# Patient Record
(demographics unavailable — no encounter records)

---

## 2024-10-18 NOTE — ASSESSMENT
[FreeTextEntry1] : Assessment: Nina Conde is a 79 year old woman with past medical history of Hypertension, Hyperlipidemia, Chronic kidney disease, Asthma, Sarcoidosis & Polymyalgia Rheumatica and TIA presents for follow up visit.  The patient was last evaluated here in the CV office in September and recent ER visit for chest pressure and cough.   The patient presented to [facility] ER on 9/10 for chest pressure and cough, had negative COVID, RSV and Influenza testing, also negative troponin and pro BNP. She reports recent presyncope symptoms without syncope. ECG today consistent with sinus rhythm, poor R wave progression, similar to prior ECGs. Echocardiogram (3/2024) consistent with normal LVEF 60-65%, mild LVH and normal RV size and function. Carotid US (12/2023) with mild plaque, no significant stenosis.   need clerance from pul dr davila for nuclear and do in hospital  Recommendations: [] Pre syncope: Orthostatic vital signs checked in office and negative. Recommended patient to discontinue Amlodipine at this time and reduce Coreg to 3.125 mg BID and to continue to monitor BP. Increase fluid intake. One week cardiac event monitor placed on patient to ensure no arrhythmias or high degree conduction disease. Check carotid US. [] Chest discomfort: Patient reports longstanding history of this, with atypical features however due to multiple CV risk factors including coronary calcifications - recommend pharmacologic nuclear stress test - explained to patient and she would like to think about this further, she also has a history of asthma, will need clearance from Pulmonary. Of note she reports possibly anaphylaxis with IV contrast. She reports unremarkable coronary angiogram at [facility] years ago. [] Coronary calcifications, HLD: Incidental finding on CTPA. LDL 72 mg/dl in 5/2024 is well controlled. Continue high intensity statin; Atorvastatin 40 mg daily  [] Hypertension: Plan for Coreg 3.125 mg BID as per above. [] Return to office: 1 month

## 2024-10-18 NOTE — PHYSICAL EXAM
[Normal Conjunctiva] : normal conjunctiva [Abnormal Gait] : abnormal gait [No Edema] : no edema [Normal] : alert and oriented, normal memory [de-identified] : no acute distress [de-identified] : supple, no carotid bruits b/l [de-identified] : JVP ~ 7 cm H20, RRR, s1, s2, no murmurs [de-identified] : unlabored respirations, clear lung fields [de-identified] : obese

## 2024-10-18 NOTE — HISTORY OF PRESENT ILLNESS
[FreeTextEntry1] : Nina Conde is a 79 year old woman with past medical history of Hypertension, Hyperlipidemia, Chronic kidney disease, Asthma, Sarcoidosis & Polymyalgia Rheumatica and TIA presents for follow up visit.  The patient was last evaluated here in the CV office in September and recent ER visit for chest pressure and cough.  feeling well

## 2024-10-18 NOTE — FAMILY HISTORY
[TextEntry] : Mother: Heart disease, Hypertension Father: Stomach cancer Brother: Heart disease Sister: CAD s/p CABG (age 40s)

## 2024-10-18 NOTE — CARDIOLOGY SUMMARY
[de-identified] : ECG (10/9/23) at [facility]: sinus rhythm, left axis deviation, LVH, poor R wave progression (similar to prior ECG) ECG (1/25/24): normal sinus rhythm, left axis deviation, borderline RVH, poor R wave progression ECG (3/21/24): normal sinus rhythm, left axis deviation, poor R wave progression  ECG (9/19/24): normal sinus rhythm, left axis deviation, poor R wave progression [de-identified] : Cardiac Event Monitor (11/2023): Normal sinus rhythm. One NSVT (7 beats). 8 SVT episodes (longest 42 seconds).  Cardiac Event Monitor (9/2024): Normal sinus rhythm. One NSVT (14 beats). 4 SVT (11 beats).  [de-identified] : TTE (10/1/23): LVEF 50-55%, mild LVH. LA enlargement. TTE (3/2024): LVEF 60-65%, mild LVH. Normal RV size and function. LA enlargement.  [de-identified] : Carotid US (9/2024): No significant hemodynamic stenosis b/l. [de-identified] : CTPA (10/2023): No pulmonary embolus. Cardiomegaly. Mild coronary calcification. No pericardial effusion. No aortic aneurysm or dissection.

## 2024-10-18 NOTE — ASSESSMENT
[FreeTextEntry1] : Assessment: Nina Conde is a 79 year old woman with past medical history of Hypertension, Hyperlipidemia, Chronic kidney disease, Asthma, Sarcoidosis & Polymyalgia Rheumatica and TIA presents for follow up visit.  The patient was last evaluated here in the CV office in September and recent ER visit for chest pressure and cough.   The patient presented to [facility] ER on 9/10 for chest pressure and cough, had negative COVID, RSV and Influenza testing, also negative troponin and pro BNP. She reports recent presyncope symptoms without syncope. ECG today consistent with sinus rhythm, poor R wave progression, similar to prior ECGs. Echocardiogram (3/2024) consistent with normal LVEF 60-65%, mild LVH and normal RV size and function. Carotid US (12/2023) with mild plaque, no significant stenosis.   need clerance from pul dr davila for nuclear and do in hospital  Recommendations: [] Pre syncope: Orthostatic vital signs checked in office and negative. Recommended patient to discontinue Amlodipine at this time and reduce Coreg to 3.125 mg BID and to continue to monitor BP. Increase fluid intake. One week cardiac event monitor placed on patient to ensure no arrhythmias or high degree conduction disease. Check carotid US. [] Chest discomfort: Patient reports longstanding history of this, with atypical features however due to multiple CV risk factors including coronary calcifications - recommend pharmacologic nuclear stress test - explained to patient and she would like to think about this further, she also has a history of asthma, will need clearance from Pulmonary. Of note she reports possibly anaphylaxis with IV contrast. She reports unremarkable coronary angiogram at [facility] years ago. [] Coronary calcifications, HLD: Incidental finding on CTPA. LDL 72 mg/dl in 5/2024 is well controlled. Continue high intensity statin; Atorvastatin 40 mg daily  [] Hypertension: Plan for Coreg 3.125 mg BID as per above. [] Return to office: 1 month

## 2024-10-18 NOTE — CARDIOLOGY SUMMARY
[de-identified] : ECG (10/9/23) at [facility]: sinus rhythm, left axis deviation, LVH, poor R wave progression (similar to prior ECG) ECG (1/25/24): normal sinus rhythm, left axis deviation, borderline RVH, poor R wave progression ECG (3/21/24): normal sinus rhythm, left axis deviation, poor R wave progression  ECG (9/19/24): normal sinus rhythm, left axis deviation, poor R wave progression [de-identified] : Cardiac Event Monitor (11/2023): Normal sinus rhythm. One NSVT (7 beats). 8 SVT episodes (longest 42 seconds).  Cardiac Event Monitor (9/2024): Normal sinus rhythm. One NSVT (14 beats). 4 SVT (11 beats).  [de-identified] : TTE (10/1/23): LVEF 50-55%, mild LVH. LA enlargement. TTE (3/2024): LVEF 60-65%, mild LVH. Normal RV size and function. LA enlargement.  [de-identified] : Carotid US (9/2024): No significant hemodynamic stenosis b/l. [de-identified] : CTPA (10/2023): No pulmonary embolus. Cardiomegaly. Mild coronary calcification. No pericardial effusion. No aortic aneurysm or dissection.

## 2024-10-18 NOTE — PHYSICAL EXAM
[Normal Conjunctiva] : normal conjunctiva [Abnormal Gait] : abnormal gait [No Edema] : no edema [Normal] : alert and oriented, normal memory [de-identified] : no acute distress [de-identified] : supple, no carotid bruits b/l [de-identified] : JVP ~ 7 cm H20, RRR, s1, s2, no murmurs [de-identified] : unlabored respirations, clear lung fields [de-identified] : obese

## 2024-10-19 NOTE — REVIEW OF SYSTEMS
[Dizziness] : dizziness [Blurry Vision] : no blurred vision [Sore Throat] : no sore throat [SOB] : no shortness of breath [Lower Ext Edema] : no extremity edema [Palpitations] : no palpitations [Syncope] : no syncope [Cough] : no cough [Abdominal Pain] : no abdominal pain [Rash] : no rash [Confusion] : no confusion was observed [Easy Bruising] : no tendency for easy bruising [Chest Discomfort] : chest discomfort

## 2024-11-07 NOTE — HISTORY OF PRESENT ILLNESS
[TextBox_4] : Ms. Conde is a 79-year-old female who has PMR, obesity, asthma, allergies, sarcoidosis, GERD, OSAS - untreated, s/p Botox for achalasia, sarcoidosis, CKD, depression, HTN, HLD, NSVT & TIA. She presents for a follow up visit.   Her chief concern is pre-procedural clearance for Nuclear Stress test to be performed in hospital by Dr. Carlson.   She states she has episodes of tachycardia for which she will be undergoing a stress test for. She is in her baseline state of health. Patient states she has not been on Breo as she had performed out months ago.  She states her insurance would not cover it out of time but she thinks it may be covered when at home. She states she had continued on montelukast nightly. Patient states she is more active over the past year and noticed improvement in her shortness of breath. She does endorse intermittent, faint wheezing about overnight.  Patient denies fever/chills, decreased appetite, increased fatigue, cough, wheezing, shortness of breath at rest or exertion, or chest tightness at this time.

## 2024-11-07 NOTE — REASON FOR VISIT
[Follow-Up] : a follow-up visit [Asthma] : asthma [Sleep Apnea] : sleep apnea [Wheezing] : wheezing [Pre-op Risk Stratification] : pre-op risk stratification

## 2024-11-07 NOTE — PHYSICAL EXAM
[No Acute Distress] : no acute distress [Normal Oropharynx] : normal oropharynx [Normal Appearance] : normal appearance [No Neck Mass] : no neck mass [Normal Rate/Rhythm] : normal rate/rhythm [Normal S1, S2] : normal s1, s2 [No Murmurs] : no murmurs [No Resp Distress] : no resp distress [Clear to Auscultation Bilaterally] : clear to auscultation bilaterally [No Abnormalities] : no abnormalities [Benign] : benign [No Clubbing] : no clubbing [No Cyanosis] : no cyanosis [No Edema] : no edema [FROM] : FROM [Normal Color/ Pigmentation] : normal color/ pigmentation [No Focal Deficits] : no focal deficits [Oriented x3] : oriented x3 [Normal Affect] : normal affect [Gait - Sufficient For Exercise Testing] : gait sufficient for exercise testing [TextBox_99] : Walker in use.

## 2024-11-07 NOTE — ASSESSMENT
[FreeTextEntry1] : Ms. Conde is a 79-year-old female who has PMR, obesity, asthma, allergies, sarcoidosis, GERD, OSAS - untreated, s/p Botox for achalasia, sarcoidosis, CKD, depression, HTN, HLD, NSVT & TIA. She presents for a follow up visit. Her chief concern is pre-procedural clearance for Nuclear Stress test to be performed in hospital by Dr. Carlson.   1. Pre-Op Pulmonary Exam: - Patient presents today in baseline state of health with no new or acute pulmonary concerns at this time. Her SOB has improved since prior visit. Patient advised to take inhalers the morning of procedure. At this point in time there are no absolute pulmonary contraindications and patient is to move forward with the planned procedure. - PFT and 6MWT today WNL.   2. Asthma: - Has nebulizer at home (dispensed 10/2023).  - Add Albuterol HFA 2 puffs Q4-6H PRN. - Restart Breo 200 mcg daily. 1 inhale. Rinse and gargle post use. - Continue Montelukast 10 mg PO QHS.   3. SRIRAM - mild in past x 2 tests, but not currently treated: - I have discussed all the negative health consequences associated with obstructive and central sleep apnea including heart conditions/MI, hypertension, diabetes, chronic inflammation, memory issues, stroke, obesity, decreased libido, sleep related accidents, as well as anxiety and depression. - Additional recommendations included: Avoid alcohol and sedatives at bedtime. Proper sleep hygiene such as maintaining a regular sleep routine, avoiding naps if possible, not watching TV or reading in bed, and maintaining a quiet, comfortable bedroom. Sleepy driving avoidance and risks discussed with patient. - Diet, exercise and weight loss suggested. - Will need to retest in future.  Patient to follow up with Dr. Montgomery as scheduled on 5/7/2025.  Patient to call with further questions and concerns. Patient verbalizes understanding of care plan and is agreeable.

## 2024-11-07 NOTE — PROCEDURE
[FreeTextEntry1] : Pulmonary testing performed in office today because of pre-procedural pulmonary clearance.   PFT's performed in office show improvement when compared to last year's test results. Today's results are normal values.  FEV1: 88%  FEV1/FVC Ratio: 98% DVN70-19%: 97% DLCO: 77%  Feno: 16 ppb; low/normal  6MWT Performed in office shows: RA SPO2 @ REST: 96% RA SPO2 on EXERTION: 94-96%

## 2024-12-12 NOTE — ASSESSMENT
[FreeTextEntry1] : Pt had coronary calcifications on CT PE study 10/2023 should follow up w/ cardio for ischemic workup she has a call out to Dr Carlson

## 2024-12-12 NOTE — HISTORY OF PRESENT ILLNESS
[FreeTextEntry1] : here for multiple issues [de-identified] : fatigue achy muscles bones  pains in chest saw cardio was to do astress test had ECHO results noted blood pressure was fluctuating and her meds were increased by cardio doubled meds  saw pulmonary  ruq pain few weeks

## 2025-01-09 NOTE — ASSESSMENT
[FreeTextEntry1] : EKG personally reviewed same as prior EKG CP seems atyipcal but if persists or worse advised to go to ER has apt to see cardio this mos

## 2025-01-09 NOTE — HISTORY OF PRESENT ILLNESS
[de-identified] : couldn't get up out of bed and had back pain  hip joint area and had to go down didn't fall on left side left posterior hip  chest pressure nonexertional  worse when lying down at night or doing quick movements feels occ palpitations no SOB diaphoresis  [FreeTextEntry1] : low back pain

## 2025-01-11 NOTE — ASSESSMENT
[FreeTextEntry1] : Assessment: Nina Conde is a 79 year old woman with past medical history of Hypertension, Hyperlipidemia, Chronic kidney disease, Asthma, Sarcoidosis, Polymyalgia Rheumatica and TIA presents for follow up visit.  Since her last visit the patient reports recurrent episodes of chest discomfort that can be worse with exertion and associated with palpitations. She has her home BP log with well controlled readings at home. ECG today consistent with sinus rhythm, poor R wave progression, similar to prior ECGs. Echocardiogram (10/2024) consistent with normal LVEF 55-60%, mild-moderate LVH, mildly enlarged RV with normal RV systolic function and mild pulmonary hypertension. Carotid US (9/2024) consistent with no significant hemodynamic stenosis b/l. Cardiac event monitor (9/2024) consistent with normal sinus rhythm, one NSVT episode of 14 beats and SVT episodes.   Recommendations: [] Chest discomfort: Patient reports longstanding history of this, with atypical features however due to multiple CV risk factors including coronary calcifications - recommend pharmacologic nuclear stress test to ensure no coronary ischemia- patient was evaluated by Pulmonary, will plan for this on Monday. Of note she reports possibly anaphylaxis with IV contrast. She reports unremarkable coronary angiogram at [facility] years ago. [] Coronary calcifications, HLD: Incidental finding on CTPA. LDL 72 mg/dl in 5/2024 is well controlled. Continue high intensity statin; Atorvastatin 40 mg daily  [] Hypertension: Stable. Continue Coreg 6.25 mg in AM and 3.125 mg in PM. [] Return to office: Plan for nuclear stress test next week

## 2025-01-11 NOTE — CARDIOLOGY SUMMARY
[de-identified] : ECG (10/9/23) at [facility]: sinus rhythm, left axis deviation, LVH, poor R wave progression (similar to prior ECG) ECG (1/25/24): normal sinus rhythm, left axis deviation, borderline RVH, poor R wave progression ECG (1/10/25): normal sinus rhythm, left axis deviation, poor R wave progression [de-identified] : Cardiac Event Monitor (11/2023): Normal sinus rhythm. One NSVT (7 beats). 8 SVT episodes (longest 42 seconds).  Cardiac Event Monitor (9/2024): Normal sinus rhythm. One NSVT (14 beats). 4 SVT (11 beats).  [de-identified] : TTE (3/2024): LVEF 60-65%, mild LVH. Normal RV size and function. LA enlargement.  TTE (10/2024): LVEF 55-60%, mild-moderate LVH. Mildly enlarged RV with normal RV systolic function. Mild pulm HTN. No pericardial effusion. [de-identified] : Carotid US (9/2024): No significant hemodynamic stenosis b/l. Kidney arterial US (7/2024): No evidence of a significant renal artery stenosis [de-identified] : CTPA (10/2023): No pulmonary embolus. Cardiomegaly. Mild coronary calcification. No pericardial effusion. No aortic aneurysm or dissection.

## 2025-01-11 NOTE — ASSESSMENT
[FreeTextEntry1] : Assessment: Nina Conde is a 79 year old woman with past medical history of Hypertension, Hyperlipidemia, Chronic kidney disease, Asthma, Sarcoidosis, Polymyalgia Rheumatica and TIA presents for follow up visit.  Since her last visit the patient reports recurrent episodes of chest discomfort that can be worse with exertion and associated with palpitations. She has her home BP log with well controlled readings at home. ECG today consistent with sinus rhythm, poor R wave progression, similar to prior ECGs. Echocardiogram (10/2024) consistent with normal LVEF 55-60%, mild-moderate LVH, mildly enlarged RV with normal RV systolic function and mild pulmonary hypertension. Carotid US (9/2024) consistent with no significant hemodynamic stenosis b/l. Cardiac event monitor (9/2024) consistent with normal sinus rhythm, one NSVT episode of 14 beats and SVT episodes.   Recommendations: [] Chest discomfort: Patient reports longstanding history of this, with atypical features however due to multiple CV risk factors including coronary calcifications - recommend pharmacologic nuclear stress test to ensure no coronary ischemia- patient was evaluated by Pulmonary, will plan for this on Monday. Of note she reports possibly anaphylaxis with IV contrast. She reports unremarkable coronary angiogram at [facility] years ago. [] Coronary calcifications, HLD: Incidental finding on CTPA. LDL 72 mg/dl in 5/2024 is well controlled. Continue high intensity statin; Atorvastatin 40 mg daily  [] Hypertension: Stable. Continue Coreg 6.25 mg in AM and 3.125 mg in PM. [] Return to office: Plan for nuclear stress test next week

## 2025-01-11 NOTE — REVIEW OF SYSTEMS
[Chest Discomfort] : chest discomfort [Headache] : no headache [Blurry Vision] : no blurred vision [Sore Throat] : no sore throat [SOB] : no shortness of breath [Lower Ext Edema] : no extremity edema [Palpitations] : no palpitations [Syncope] : no syncope [Cough] : no cough [Abdominal Pain] : no abdominal pain [Rash] : no rash [Dizziness] : no dizziness [Confusion] : no confusion was observed [Easy Bruising] : no tendency for easy bruising

## 2025-01-11 NOTE — CARDIOLOGY SUMMARY
[de-identified] : Cardiac Event Monitor (11/2023): Normal sinus rhythm. One NSVT (7 beats). 8 SVT episodes (longest 42 seconds).  Cardiac Event Monitor (9/2024): Normal sinus rhythm. One NSVT (14 beats). 4 SVT (11 beats).  [de-identified] : ECG (10/9/23) at [facility]: sinus rhythm, left axis deviation, LVH, poor R wave progression (similar to prior ECG) ECG (1/25/24): normal sinus rhythm, left axis deviation, borderline RVH, poor R wave progression ECG (1/10/25): normal sinus rhythm, left axis deviation, poor R wave progression [de-identified] : TTE (3/2024): LVEF 60-65%, mild LVH. Normal RV size and function. LA enlargement.  TTE (10/2024): LVEF 55-60%, mild-moderate LVH. Mildly enlarged RV with normal RV systolic function. Mild pulm HTN. No pericardial effusion. [de-identified] : Carotid US (9/2024): No significant hemodynamic stenosis b/l. Kidney arterial US (7/2024): No evidence of a significant renal artery stenosis [de-identified] : CTPA (10/2023): No pulmonary embolus. Cardiomegaly. Mild coronary calcification. No pericardial effusion. No aortic aneurysm or dissection.

## 2025-01-11 NOTE — PHYSICAL EXAM
[Normal Conjunctiva] : normal conjunctiva [Abnormal Gait] : abnormal gait [No Edema] : no edema [Normal] : alert and oriented, normal memory [de-identified] : no acute distress [de-identified] : supple, no carotid bruits b/l [de-identified] : JVP ~ 7 cm H20, RRR, s1, s2, no murmurs [de-identified] : unlabored respirations, clear lung fields [de-identified] : obese

## 2025-01-11 NOTE — HISTORY OF PRESENT ILLNESS
[FreeTextEntry1] : Nina Conde is a 79 year old woman with past medical history of Hypertension, Hyperlipidemia, Chronic kidney disease, Asthma, Sarcoidosis, Polymyalgia Rheumatica and TIA presents for follow up visit.  Since her last visit the patient reports recurrent episodes of chest discomfort that can be worse with exertion and associated with palpitations. She has her home BP log with well controlled BP.

## 2025-01-14 NOTE — REVIEW OF SYSTEMS
[Arthralgias] : arthralgias [Joint Pain] : joint pain [Joint Swelling] : no joint swelling [Joint Stiffness] : no joint stiffness [As Noted in HPI] : as noted in HPI [Difficulty Walking] : difficulty walking [Negative] : Heme/Lymph [de-identified] : Memory issues

## 2025-01-14 NOTE — ASSESSMENT
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 Had been able to taper steroids without recurrence of sx to pre-steroid levels but with ongoing HA, scalp TTP and mild shoulder sx as well as only 50% improvement in ESR. Due to ongoing elevated ESR, recurrence of temporal pain, diffuse arthralgias and SPEP/UPEP negative -- increased concern for recurrence of GCA, started on Actemra and moderate dose steroids with improvement in sx and labs, currently quiescent.  # PMR/GCA, + hx of sarcoid, HA continues to appear more tension mediated - c/w Actemra qweekly dosing - GCA symptoms reviewed with patient, advised to call office if develops any, go to ER if any visual changes - recent imaging without pulm or neuro sarcoid  - last labs reviewed and stable - repeat prior to next visit   # immunosuppressed status, prior single dermatome limited shingles outbreak - pt knows to call if febrile or unwell, knows to hold medication while on Abx - advised annual flu vaccine, covid booster   # OA and MSK related pain in back and shoulders/hips, TMJ. Tapering of steroids has unmasked this. Generalized deconditioning and poor adherence to lifestyle changes contributing and progressing. # peripheral neuropathy  # unsteady gait, less falls, lightheadedness of unclear etiology - f/u neuro/PMR for trigger point injections as these help with her pain - c/w prednisone 5mg/day  - c/w lyrica, try to increase to 100 mg at bedtime since off Cymbalta as minimal other med options for OA pain at present time  - c/w Tylenol ES and again reviewed recommendation to use consistently 2 tabs to 2x/day - pt declines PT referral today   # OP in femoral neck, lowest T score -2.7, + steroids, low Vit D - Getting Prolia with PMD  - Ca 600mg BID with food - c/w Vit D 1000 IU daily as slightly low and was not taking daily - Weight bearing exercise for 30min 3-4x/week to maintain BMD   Seen with Keke Rodgers MD, Rheumatology Fellow, PGY-5

## 2025-01-14 NOTE — HISTORY OF PRESENT ILLNESS
[FreeTextEntry1] : SUNITA QUEZADA is a 76 year old woman who presents for rheumatologic second opinion for prior dx of GCA/PMR. Markedly elevated ESR since Nov 2019, with peak at 114 in July 2020 accompanied by shoulder/hip girdle as well as diffuse arthralgias with stiffness and limited ROM as well as worsening temporal headaches and scalp tenderness. At that time, pt underwent b/l TAB which were negative, and was started on prednisone 40mg daily with improvement in above sx. Has since been able to taper to 5mg daily which she has been on for the last month. Overall sx have not recurred at pre-steroid levels during taper but pt does report ongoing mild sx of scalp TTP and intermittent temporal HA with only moderate decline in ESR, with last check at 47. In light of this, prior rheum suggested transition from steroids to Actemra given her other comorbidities including DM2. Pt was reluctant to start injectable biologics.  + R TMJ pain but with palpation as well as chewing and no jaw fatigability and not selecting softer foods  + extensive OA with chronic mild pain from this + sarcoid, stable at present without any pulmonary, skin, ocular or joint involvement   Denies visual changes, jaw claudication, F/C, night sweats, unintentional weight loss, limb weakness or paresthesias.   Inflammatory arthritis ROS negative for symmetrical peripheral joint synovitis, prolonged AM stiffness, enthesitis, dactylitis, psoriasis/ rashes, eye inflammation, inflammatory low back pain, IBD.   Labs - + ESR, peak 114, presently 47, now risen to 72  Negative - RF, KRISTINE, Hep B, TB  TAB b/l - negative for GCA  XR knees - small enthesophyte, minimal OA  XR ribs - negative, NAPD  DEXA 2022 - lowest T score -2.7 in hip, worse than prior  -------  2/4/21 -- Diffuse OA related arthralgias as she is tapering off steroids but remains without PMR or GCA sx. Ongoing R TMJ but not fatigability of jaw, rather pain with increased oral aperture 2/2 joint. Less ambulatory 2/2 OA pain, noting weight increasing and LE edema. No sarcoid sx.   3/11/21 -- Since last visit, s/p both covid vaccine doses, severe myalgias/arthralgias following each but improved after 1st one and does not seem to be improving s/p 2nd one 1 week ago. + diffuse joint pains in shoulder/hip girdle and also wrists and knees, but no synovitis. HA worsening though still intermittent but TA tenderness has returned. Vision stable, no jaw claudication. Ongoing TMJ issues, due to see ENT. No CP, SOB, F/C, infectious sx, focal weakness or neuropathic sx.   4/2/21 -- Started on Actemra 2 weeks ago, no issues with administering, brings son today so we can teach him as well. Ongoing dull HA, partially improved with Tylenol, prior TA tenderness resolved, vision stable, and no current PMR complaints. No F/C, infectious sx, feels well otherwise.   5/7/21 -- Increased fatigue, diffuse arthralgias, shingles on L buttock that began shortly after last visit, pt did not notify any of her physicians, continued Actemra. Lesions have now fully resolved, no active vesicles but with some post herpetic pain, over a single dermatome, did not have disseminated disease. Remains with occasional dull HA but has improved since last visit, remainder of GCA/PMR ROS negative.   7/9/21 -- TMJ resolved, HA has also markedly improved. Pending R cataract sx. Feeling well otherwise, no PMR sx, no other GCA sx, no issues with injections. No infectious sx.   10/19/21-- Worsening pain in b/l shoulders, CT spinal pain as well, temporary improvement with trigger injections x 2 weeks, then spinal pain returns. Pain mgmt is trying to increase Lyrica dose but its is sedating her. No current HA, remainder of GCA/PMR ROS negative, no infectious sx, having some issues with self administration of Actemra but her son helps her if needed.   11/19/21 -- Returns early as she feels she needs the prednisone on some days and wants to know if she can use prn 2.5mg/day. Also will be [place] of state and wont be able to get actemra delivered, asking if ok to miss dose by 3 days. GCA ROS/PMR ROS negative, the low dose steroids have moreso been helping with her neck/upper back pain. Asking about trials of other meds for her ongoing pedal neuropathy as she has not been able to increase lyrica and now wants to get off it if she can. Also worried that her memory seems to be getting worse but no issues with ingrid forgetting of things, never gets lost.   1/13/22 -- Overall feeling better from last visit, lower Lyrica dose is less sedating. Ongoing QHS cervicogenic HA but responsive to low dose tylenol (taking 250mg), asking if she should continue trigger point injections as not always helpful. GCA/PMR ROS negative, taking Actemra without issues, no infectious sx. 1 fall but no fx, being more cautious with walking.   3/18/22 -- Ongoing OA related arthralgias, no synovitis and GCA/PMR ROS remain negative. Continues to have an issue with self administering Actemra so has been using it approx 10 min out of the fridge but this is causing some pain/bruising, not amenable to changing to infusions however. No further falls.   4/12/22 -- GCA/PMR quiescent, no peripheral synovitis, ongoing diffuse OA related arthralgias and worsening neuropathy especially in RLE since tapering dose of Lyrica. Does feel being on steroids daily somewhat helps, has not been taking Tylenol consistently. Intermittent HA but without GCA characteristics, likely related to eye strain and will be getting cataract sx upcoming. No falls. No infectious sx, still feels like Actemra injection is difficult.   5/27/22 -- Actemra injections going better. No current PMR/GCA or sarcoid sx, no peripheral synovitis. Ongoing OA related pain but able to do all ADLs. Tolerated increase in Lyrica and helping but not fully, asking if we can increase again, not overly sedating her. Ongoing intermittent HA but responsive to tylenol, s/p cataract sx and healed well. No falls.   7/12/22 -- Feeling unwell x 2 weeks, fatigued, diffuse arthralgias, worst in L shoulder where she has prior limited ROM, and L hip which has had achy pain with radiation to thigh, tho not worsening with ROM. + some pedal edema, sharp temporal HA over R side. Neuro saw her, had MRI, doppler and EEG -- all normal. No infectious sx, no F/C, no synovitis, no rashes, remainder of GCA ROS negative and R shoulder unaffected.    9/9/22 -- Prior severe fatigue and arthralgias have resolved. More chronic pain at present and some dependant edema now, has been advised to limit salt in diet but continues to have a high salt intake and son present at visit today reports she minimally walks during the day. She is moving to a new apt with 2 flights of stairs to enter, hard time walking up the stairs. Cardiology did ask for a stress test, she has not scheduled yet. GCA/PMR ROS negative, taking Actemra daily, no SE or infections.   12/13/22 -- Overall stable from last, ongoing chronic arthralgias with ongoing gait issues and L hip arthralgias, no falls but hard to move around, did not go to PT, PMD recently reordered PT which she has not started. PMR/GCA ROS negative, recent optho exam also negative, ongoing tension HA but responsive to Tylenol. No infectious sx.   3/7/23 -- Ongoing C spine and shoulder MSK pain, more focal TTP over R sided scapula/posterior ribs x few weeks, no preceding trauma, ongoing L sided costochondritis pain, seeing cards tomorrow too, has not had stress test as yet. Has not started PT or optimized tylenol at present tho does find benefit with QHS dosing. No current PMR/GCA sx, ongoing intermittent tension HA, remains responsive to tylenol. No other inflammatory sx. She had cut back on driving 2/2 her chronic peripheral neuropathy.   5/19/2023 -- Endorsing worsening joint pain, weakness, near falls, and difficulty doing tasks, more constant than it has been in the past, has not been able to follow up with PT. Endorsing headaches, not constant or daily, describes as nagging but not worse headache of life, Tylenol not always effective, will usually fall asleep and pain resolves. Doing Actemra qow and still on prednisone 5mg qday. Sarcoid ROS negative. intermittent productive cough with clear phlegm, worse in AM and directly before bed, which is new. No clear infectious sx.   8/29/23 -- Son accompanied pt to visit today. Recent fall but no ongoing pain, worsening deconditioning as she is minimally active during the day, reports diffuse pain but stable, reports ongoing depressive sx but hasn't been taking her SSRI for a long time (tho reports telling her PMD she has been taking), no SI/HI. Not driving at present and still living in apt with stairs but son is trying to get her alternative, more accessible housing. Resumed Actemra qweekly, no issues, no current PMR/GCA sx, remains with tension HA responsive to tylenol. Sarcoid ROS negative, prior cough resolved.   11/30/23 -- Pt says she has had worsening memory the last couple of months. Had TIA episode 10/2023. Also has fallen 3x since 10/2023. Says she ambulates and falls, but does not remember how she falls. Has not gone to hospital since falls. Has lower back, hip, rib pain since falls. Has stopped driving 2/2 above, but continues to live alone, feels she can still do ADLs independently.   4/11/24 -- Pt had mechanical fall in office last visit, hospitalized and was in [place] for ~1 month, ambulating better with walker but still reports some unsteady gait. Cardiac/neuro workup negative, ultimately thought to be 2/2 dehydration vs iatrogenic hypotension 2/2 home meds. After discharge, had syncope fall, workup again unrevealing, ACE discontinued. Pt says she feels well today, more mindful to keep hydrated with water. Has same chronic baseline frontal tension headaches which remain Tylenol responsive, GCA and PMR ROS negative, remains on weekly Actemra and prednisone 5mg daily. Sarcoid ROS negative, imaging not suggestive either.   5/30/24 -- Using walker consistently, 1 slide/fall into a chair but no ingrid falls, completed home PT and has refused regular PT. Ongoing stress HA over temples, remains responsive to tylenol, GCA ROS negative. No infections with Actemra/prednisone. Neurology is planning to resume Botox for her neck pain. Has lost some weight and thinks its helping with overall pain. Notes more diarrhea than prior, 2 episodes of incontinence recently, no blood, alternating with constipation.  PMD planning to start Prolia for her.   9/26/24 - She is using a walker consistently. + frequent nightmares with Cymbalta  60 mg so PCP reduced it to 40 mg about two weeks ago, but she discontinued it due to 'personality changes' and ongoing nightmares which have resolved. Taking Actemra, no infectious sx, GCA/PMR ROS negative, s/p Prolia with mild, self limited arthralgia. Remains on Lyrica but only taking  mg QHS depending on day.

## 2025-01-14 NOTE — PHYSICAL EXAM
[General Appearance - Alert] : alert [General Appearance - In No Acute Distress] : in no acute distress [General Appearance - Well Nourished] : well nourished [Sclera] : the sclera and conjunctiva were normal [PERRL With Normal Accommodation] : pupils were equal in size, round, and reactive to light [Extraocular Movements] : extraocular movements were intact [Outer Ear] : the ears and nose were normal in appearance [Oropharynx] : the oropharynx was normal [Neck Appearance] : the appearance of the neck was normal [Respiration, Rhythm And Depth] : normal respiratory rhythm and effort [No Spinal Tenderness] : no spinal tenderness [Nail Clubbing] : no clubbing  or cyanosis of the fingernails [Musculoskeletal - Swelling] : no joint swelling seen [Motor Tone] : muscle strength and tone were normal [] : no rash [Motor Exam] : the motor exam was normal [FreeTextEntry1] : Decreased sensation to light touch b/l feet  [Oriented To Time, Place, And Person] : oriented to person, place, and time [Affect] : the affect was normal

## 2025-01-30 NOTE — PHYSICAL EXAM
[Normal Conjunctiva] : normal conjunctiva [Abnormal Gait] : abnormal gait [No Edema] : no edema [Normal] : alert and oriented, normal memory [de-identified] : no acute distress [de-identified] : supple, no carotid bruits b/l [de-identified] : JVP ~ 7 cm H20, RRR, s1, s2, no murmurs [de-identified] : unlabored respirations, clear lung fields [de-identified] : obese

## 2025-01-30 NOTE — ASSESSMENT
[FreeTextEntry1] : Assessment: Nina Conde is a 79 year old woman with past medical history of Hypertension, Hyperlipidemia, Chronic kidney disease, Asthma, Sarcoidosis, Polymyalgia Rheumatica and TIA with recent unremarkable coronary angiogram (1/2025) presents for follow up visit.  Since her last visit the patient had coronary angiogram done which was unremarkable. She reports some discomfort during the procedure and right arm bruising. She reports that her BP has been elevated since the angiogram, SBP up to 150s. She has had palpitations, denies chest pain or shortness of breath. ECG consistent with normal sinus rhythm. BP mildly elevated. Echocardiogram (10/2024) consistent with normal LVEF 55-60%, mild-moderate LVH, mildly enlarged RV with normal RV systolic function and mild pulmonary hypertension. Carotid US (9/2024) consistent with no significant hemodynamic stenosis b/l. Cardiac event monitor (9/2024) consistent with normal sinus rhythm, one NSVT episode of 14 beats and SVT episodes. Coronary angiogram (1/2025) consistent with normal LM, other vessels with minor irregularities. Labs (1/2025) consistent with CBC (WBC 12), BMP (Cr 0.96, GFR 60).  Recommendations: [] Chest discomfort: No symptoms at this time. No obstructive CAD on coronary angiogram.  [] Hyperlipidemia: LDL 62 mg/dl is well controlled. Continue Atorvastatin 40 mg daily  [] Hypertension: BP mildly elevated, will increase Coreg to 6.25 mg BID and will start HCTZ 12.5 mg daily, patient will continue to monitor BP. [] Return to office: 2 weeks

## 2025-01-30 NOTE — REVIEW OF SYSTEMS
[Easy Bruising] : no tendency for easy bruising [Palpitations] : palpitations [Headache] : no headache [Blurry Vision] : no blurred vision [Sore Throat] : no sore throat [SOB] : no shortness of breath [Chest Discomfort] : no chest discomfort [Lower Ext Edema] : no extremity edema [Syncope] : no syncope [Cough] : no cough [Abdominal Pain] : no abdominal pain [Rash] : no rash [Dizziness] : no dizziness [Confusion] : no confusion was observed

## 2025-01-30 NOTE — HISTORY OF PRESENT ILLNESS
[FreeTextEntry1] : Nina Conde is a 79 year old woman with past medical history of Hypertension, Hyperlipidemia, Chronic kidney disease, Asthma, Sarcoidosis, Polymyalgia Rheumatica and TIA with recent unremarkable coronary angiogram (1/2025) presents for follow up visit.  Since her last visit the patient had coronary angiogram done which was unremarkable. She reports some discomfort during the procedure and right arm bruising. She reports that her BP has been elevated since the angiogram, SBP up to 150s. She has had palpitations, denies chest pain or shortness of breath.

## 2025-01-30 NOTE — CARDIOLOGY SUMMARY
[de-identified] : CTPA (10/2023): No pulmonary embolus. Cardiomegaly. Mild coronary calcification. No pericardial effusion. No aortic aneurysm or dissection. [de-identified] : ECG (10/9/23) at [facility]: sinus rhythm, left axis deviation, LVH, poor R wave progression (similar to prior ECG) ECG (1/25/24): normal sinus rhythm, left axis deviation, borderline RVH, poor R wave progression ECG (1/10/25): normal sinus rhythm, left axis deviation, poor R wave progression ECG (1/30/25): normal sinus rhythm [de-identified] : Cardiac Event Monitor (11/2023): Normal sinus rhythm. One NSVT (7 beats). 8 SVT episodes (longest 42 seconds).  Cardiac Event Monitor (9/2024): Normal sinus rhythm. One NSVT (14 beats). 4 SVT (11 beats).  [de-identified] : TTE (3/2024): LVEF 60-65%, mild LVH. Normal RV size and function. LA enlargement.  TTE (10/2024): LVEF 55-60%, mild-moderate LVH. Mildly enlarged RV with normal RV systolic function. Mild pulm HTN. No pericardial effusion. [de-identified] : Coronary angiogram (1/24/25): LM normal. LAD minor irregularities. D1 minor irregularities. LCx minor irregularities. OM1 minor irregularities. RCA minor irregularities.  [de-identified] : Carotid US (9/2024): No significant hemodynamic stenosis b/l. Kidney arterial US (7/2024): No evidence of a significant renal artery stenosis

## 2025-01-30 NOTE — PHYSICAL EXAM
[Normal Conjunctiva] : normal conjunctiva [Abnormal Gait] : abnormal gait [No Edema] : no edema [Normal] : alert and oriented, normal memory [de-identified] : no acute distress [de-identified] : supple, no carotid bruits b/l [de-identified] : JVP ~ 7 cm H20, RRR, s1, s2, no murmurs [de-identified] : unlabored respirations, clear lung fields [de-identified] : obese

## 2025-01-30 NOTE — CARDIOLOGY SUMMARY
[de-identified] : CTPA (10/2023): No pulmonary embolus. Cardiomegaly. Mild coronary calcification. No pericardial effusion. No aortic aneurysm or dissection. [de-identified] : ECG (10/9/23) at [facility]: sinus rhythm, left axis deviation, LVH, poor R wave progression (similar to prior ECG) ECG (1/25/24): normal sinus rhythm, left axis deviation, borderline RVH, poor R wave progression ECG (1/10/25): normal sinus rhythm, left axis deviation, poor R wave progression ECG (1/30/25): normal sinus rhythm [de-identified] : Cardiac Event Monitor (11/2023): Normal sinus rhythm. One NSVT (7 beats). 8 SVT episodes (longest 42 seconds).  Cardiac Event Monitor (9/2024): Normal sinus rhythm. One NSVT (14 beats). 4 SVT (11 beats).  [de-identified] : TTE (3/2024): LVEF 60-65%, mild LVH. Normal RV size and function. LA enlargement.  TTE (10/2024): LVEF 55-60%, mild-moderate LVH. Mildly enlarged RV with normal RV systolic function. Mild pulm HTN. No pericardial effusion. [de-identified] : Coronary angiogram (1/24/25): LM normal. LAD minor irregularities. D1 minor irregularities. LCx minor irregularities. OM1 minor irregularities. RCA minor irregularities.  [de-identified] : Carotid US (9/2024): No significant hemodynamic stenosis b/l. Kidney arterial US (7/2024): No evidence of a significant renal artery stenosis

## 2025-02-20 NOTE — ASSESSMENT
[FreeTextEntry1] : Assessment: Nina Conde is a 79 year old woman with past medical history of Hypertension, Hyperlipidemia, Chronic kidney disease, Asthma, Sarcoidosis, Polymyalgia Rheumatica and TIA with recent unremarkable coronary angiogram (1/2025) presents for follow up visit.  Since her last visit the patient reports that she continues to have discomfort of her right arm since the catherization, it feels heavy and weak. She denies numbness of the arm. She denies angina or dyspnea. Home BP log of 140/90s on average. ECG consistent with normal sinus rhythm, poor R wave progression, similar to prior ECG. BP stable. Echocardiogram (10/2024) consistent with normal LVEF 55-60%, mild-moderate LVH, mildly enlarged RV with normal RV systolic function and mild pulmonary hypertension. Carotid US (9/2024) consistent with no significant hemodynamic stenosis b/l. Cardiac event monitor (9/2024) consistent with normal sinus rhythm, one NSVT episode of 14 beats and SVT episodes. Coronary angiogram (1/2025) consistent with normal LM, other vessels with minor irregularities. Labs (1/2025) consistent with CBC (WBC 12), BMP (Cr 0.96, GFR 60).  Recommendations: [] Right arm discomfort: Post catherization, may be related to nerve pain, no signs of poor perfusion on exam. Check upper extremity venous and arterial US, pending findings will discuss with the interventional cardiologist.  [] Chest discomfort: No symptoms at this time. No obstructive CAD on coronary angiogram.  [] Hyperlipidemia: LDL 62 mg/dl is well controlled. Continue Atorvastatin 40 mg daily  [] Hypertension: BP improved, continue Coreg 6.25 mg BID and HCTZ 12.5 mg daily, patient will continue to monitor BP. [] Return to office: 3 months or sooner if needed

## 2025-02-20 NOTE — PHYSICAL EXAM
[Normal Conjunctiva] : normal conjunctiva [Abnormal Gait] : abnormal gait [No Edema] : no edema [Normal] : alert and oriented, normal memory [de-identified] : no acute distress [de-identified] : supple [de-identified] : JVP ~ 7 cm H20, RRR, s1, s2, no murmurs [de-identified] : unlabored respirations, clear lung fields [de-identified] : obese [de-identified] : right arm well perfused, 2+ radial pulse

## 2025-02-20 NOTE — CARDIOLOGY SUMMARY
[de-identified] : ECG (10/9/23) at [facility]: sinus rhythm, left axis deviation, LVH, poor R wave progression (similar to prior ECG) ECG (1/25/24): normal sinus rhythm, left axis deviation, borderline RVH, poor R wave progression ECG (1/10/25): normal sinus rhythm, left axis deviation, poor R wave progression ECG (2/20/25): normal sinus rhythm, left axis deviation, LVH, poor R wave progression [de-identified] : Cardiac Event Monitor (11/2023): Normal sinus rhythm. One NSVT (7 beats). 8 SVT episodes (longest 42 seconds).  Cardiac Event Monitor (9/2024): Normal sinus rhythm. One NSVT (14 beats). 4 SVT (11 beats).  [de-identified] : TTE (3/2024): LVEF 60-65%, mild LVH. Normal RV size and function. LA enlargement.  TTE (10/2024): LVEF 55-60%, mild-moderate LVH. Mildly enlarged RV with normal RV systolic function. Mild pulm HTN. No pericardial effusion. [de-identified] : Coronary angiogram (1/24/25): LM normal. LAD minor irregularities. D1 minor irregularities. LCx minor irregularities. OM1 minor irregularities. RCA minor irregularities.  [de-identified] : Carotid US (9/2024): No significant hemodynamic stenosis b/l. Kidney arterial US (7/2024): No evidence of a significant renal artery stenosis

## 2025-02-20 NOTE — HISTORY OF PRESENT ILLNESS
[FreeTextEntry1] : Nina Conde is a 79 year old woman with past medical history of Hypertension, Hyperlipidemia, Chronic kidney disease, Asthma, Sarcoidosis, Polymyalgia Rheumatica and TIA with recent unremarkable coronary angiogram (1/2025) presents for follow up visit.  Since her last visit the patient reports that she continues to have discomfort of her right arm since the catherization, it feels heavy and weak. She denies numbness of the arm. She denies chest pain or shortness of breath. Home BP log of 140/90s on average.

## 2025-02-20 NOTE — CARDIOLOGY SUMMARY
[de-identified] : ECG (10/9/23) at [facility]: sinus rhythm, left axis deviation, LVH, poor R wave progression (similar to prior ECG) ECG (1/25/24): normal sinus rhythm, left axis deviation, borderline RVH, poor R wave progression ECG (1/10/25): normal sinus rhythm, left axis deviation, poor R wave progression ECG (2/20/25): normal sinus rhythm, left axis deviation, LVH, poor R wave progression [de-identified] : Cardiac Event Monitor (11/2023): Normal sinus rhythm. One NSVT (7 beats). 8 SVT episodes (longest 42 seconds).  Cardiac Event Monitor (9/2024): Normal sinus rhythm. One NSVT (14 beats). 4 SVT (11 beats).  [de-identified] : TTE (3/2024): LVEF 60-65%, mild LVH. Normal RV size and function. LA enlargement.  TTE (10/2024): LVEF 55-60%, mild-moderate LVH. Mildly enlarged RV with normal RV systolic function. Mild pulm HTN. No pericardial effusion. [de-identified] : Coronary angiogram (1/24/25): LM normal. LAD minor irregularities. D1 minor irregularities. LCx minor irregularities. OM1 minor irregularities. RCA minor irregularities.  [de-identified] : Carotid US (9/2024): No significant hemodynamic stenosis b/l. Kidney arterial US (7/2024): No evidence of a significant renal artery stenosis

## 2025-02-20 NOTE — REVIEW OF SYSTEMS
[Headache] : no headache [Blurry Vision] : no blurred vision [Sore Throat] : no sore throat [SOB] : no shortness of breath [Chest Discomfort] : no chest discomfort [Lower Ext Edema] : no extremity edema [Palpitations] : no palpitations [Syncope] : no syncope [Cough] : no cough [Abdominal Pain] : no abdominal pain [Rash] : no rash [Dizziness] : no dizziness [Confusion] : no confusion was observed

## 2025-02-20 NOTE — PHYSICAL EXAM
[Normal Conjunctiva] : normal conjunctiva [Abnormal Gait] : abnormal gait [No Edema] : no edema [Normal] : alert and oriented, normal memory [de-identified] : no acute distress [de-identified] : supple [de-identified] : JVP ~ 7 cm H20, RRR, s1, s2, no murmurs [de-identified] : unlabored respirations, clear lung fields [de-identified] : obese [de-identified] : right arm well perfused, 2+ radial pulse

## 2025-05-01 NOTE — HISTORY OF PRESENT ILLNESS
[FreeTextEntry1] : Nina Conde is a 79 year old woman with past medical history of Hypertension, Hyperlipidemia, Chronic kidney disease, Asthma, Sarcoidosis, Polymyalgia Rheumatica and TIA with recent unremarkable coronary angiogram (1/2025) presents for an acute visit regarding elevated blood pressure.  The patient reports that for the past 2 weeks she has noticed increasing palpitations mostly at night and leg swelling. She also noticed that her BP has been rising and she has not felt well and believes that she gained weight. She reports chronic 2 pillow orthopnea. She denies exertional chest pain. She admits to poor dietary choices and eating Chinese food and sausages, she also ran out of her HCTZ for the past 2 weeks.

## 2025-05-01 NOTE — CARDIOLOGY SUMMARY
[de-identified] : ECG (10/9/23) at [facility]: sinus rhythm, left axis deviation, LVH, poor R wave progression (similar to prior ECG) ECG (1/25/24): normal sinus rhythm, left axis deviation, borderline RVH, poor R wave progression ECG (1/10/25): normal sinus rhythm, left axis deviation, poor R wave progression ECG (5/1/25): sinus bradycardia, poor R wave progression [de-identified] : Cardiac Event Monitor (11/2023): Normal sinus rhythm. One NSVT (7 beats). 8 SVT episodes (longest 42 seconds).  Cardiac Event Monitor (9/2024): Normal sinus rhythm. One NSVT (14 beats). 4 SVT (11 beats).  [de-identified] : Nuclear stress test (1/2025): Small sized, mild-moderate intensity, mostly reversible perfusion defect of the apical inferior and mid inferolateral wall suggestive of ischemia. Post stress LVEF 84%.  [de-identified] : TTE (3/2024): LVEF 60-65%, mild LVH. Normal RV size and function. LA enlargement.  TTE (10/2024): LVEF 55-60%, mild-moderate LVH. Mildly enlarged RV with normal RV systolic function. Mild pulm HTN. No pericardial effusion. [de-identified] : Coronary angiogram (1/24/25): LM normal. LAD minor irregularities. D1 minor irregularities. LCx minor irregularities. OM1 minor irregularities. RCA minor irregularities.  [de-identified] : Carotid US (9/2024): No significant hemodynamic stenosis b/l. Kidney arterial US (7/2024): No evidence of a significant renal artery stenosis Right arm arterial US (3/2025): No stenosis  Right arm venous US (3/2025): No DVT

## 2025-05-01 NOTE — PHYSICAL EXAM
[Normal Conjunctiva] : normal conjunctiva [Normal] : alert and oriented, normal memory [de-identified] : no acute distress [de-identified] : supple, no carotid bruits b/l [de-identified] : JVP ~ 7 cm H20, RRR, s1, s2, no murmurs [de-identified] : unlabored respirations, clear lung fields [de-identified] : obese [de-identified] : mild lower extremity edema b/l

## 2025-05-01 NOTE — ASSESSMENT
[FreeTextEntry1] : Assessment: Nina Conde is a 79 year old woman with past medical history of Hypertension, Hyperlipidemia, Chronic kidney disease, Asthma, Sarcoidosis, Polymyalgia Rheumatica and TIA with recent unremarkable coronary angiogram (1/2025) presents for an acute visit regarding elevated blood pressure.  The patient reports that for the past 2 weeks she has noticed increasing palpitations mostly at night and leg swelling as well as rising blood pressure. She admits to poor dietary choices and increased sodium in her diet - Chinese food and sausages and she also ran out of her HCTZ for 2 weeks. ECG today consistent with sinus bradycardia and poor R wave progression, similar to prior ECG. BP stable. Echocardiogram (10/2024) consistent with normal LVEF 55-60%, mild-moderate LVH, mildly enlarged RV with normal RV systolic function and mild pulmonary hypertension. Carotid US (9/2024) consistent with no significant hemodynamic stenosis b/l. Cardiac event monitor (9/2024) consistent with normal sinus rhythm, one NSVT episode of 14 beats and SVT episodes. Coronary angiogram (1/2025) consistent with normal LM, other vessels with minor irregularities.   Recommendations: [] Palpitations: One week cardiac event monitor placed on patient to ensure no arrhythmia. Patient has history of NSVT and SVT and takes Coreg 6.25 mg BID which cannot be increased further due to baseline sinus bradycardia.  [] Leg edema: Likely secondary to patient not taking diuretic and increased sodium intake. Recommended patient to avoid high sodium foods and HCTZ has been refilled at higher dose of 25 mg daily and patient will restart this. Check echo to ensure no cardiomyopathy.  [] Hyperlipidemia: LDL 62 mg/dl (1/2025) is well controlled. Continue Atorvastatin 40 mg daily  [] Hypertension: BP stable, however due to elevated BP at home plan to resume HCTZ at higher dose 25 mg daily. Continue Coreg 6.25 mg BID. Patient will continue to monitor BP. [] Return to office: 3 weeks

## 2025-05-01 NOTE — CARDIOLOGY SUMMARY
[de-identified] : ECG (10/9/23) at [facility]: sinus rhythm, left axis deviation, LVH, poor R wave progression (similar to prior ECG) ECG (1/25/24): normal sinus rhythm, left axis deviation, borderline RVH, poor R wave progression ECG (1/10/25): normal sinus rhythm, left axis deviation, poor R wave progression ECG (5/1/25): sinus bradycardia, poor R wave progression [de-identified] : Cardiac Event Monitor (11/2023): Normal sinus rhythm. One NSVT (7 beats). 8 SVT episodes (longest 42 seconds).  Cardiac Event Monitor (9/2024): Normal sinus rhythm. One NSVT (14 beats). 4 SVT (11 beats).  [de-identified] : Nuclear stress test (1/2025): Small sized, mild-moderate intensity, mostly reversible perfusion defect of the apical inferior and mid inferolateral wall suggestive of ischemia. Post stress LVEF 84%.  [de-identified] : TTE (3/2024): LVEF 60-65%, mild LVH. Normal RV size and function. LA enlargement.  TTE (10/2024): LVEF 55-60%, mild-moderate LVH. Mildly enlarged RV with normal RV systolic function. Mild pulm HTN. No pericardial effusion. [de-identified] : Coronary angiogram (1/24/25): LM normal. LAD minor irregularities. D1 minor irregularities. LCx minor irregularities. OM1 minor irregularities. RCA minor irregularities.  [de-identified] : Carotid US (9/2024): No significant hemodynamic stenosis b/l. Kidney arterial US (7/2024): No evidence of a significant renal artery stenosis Right arm arterial US (3/2025): No stenosis  Right arm venous US (3/2025): No DVT

## 2025-05-01 NOTE — PHYSICAL EXAM
[Normal Conjunctiva] : normal conjunctiva [Normal] : alert and oriented, normal memory [de-identified] : no acute distress [de-identified] : supple, no carotid bruits b/l [de-identified] : JVP ~ 7 cm H20, RRR, s1, s2, no murmurs [de-identified] : unlabored respirations, clear lung fields [de-identified] : obese [de-identified] : mild lower extremity edema b/l

## 2025-05-01 NOTE — REVIEW OF SYSTEMS
[Lower Ext Edema] : lower extremity edema [Palpitations] : palpitations [Headache] : no headache [Blurry Vision] : no blurred vision [Sore Throat] : no sore throat [SOB] : no shortness of breath [Chest Discomfort] : no chest discomfort [Syncope] : no syncope [Cough] : no cough [Abdominal Pain] : no abdominal pain [Rash] : no rash [Dizziness] : no dizziness [Confusion] : no confusion was observed

## 2025-05-13 NOTE — REVIEW OF SYSTEMS
[Arthralgias] : arthralgias [Joint Pain] : joint pain [Difficulty Walking] : difficulty walking [Negative] : Heme/Lymph [As Noted in HPI] : as noted in HPI [Joint Swelling] : no joint swelling [Joint Stiffness] : no joint stiffness [de-identified] : Memory issues

## 2025-05-13 NOTE — ASSESSMENT
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 Had been able to taper steroids without recurrence of sx to pre-steroid levels but with ongoing HA, scalp TTP and mild shoulder sx as well as only 50% improvement in ESR. Due to ongoing elevated ESR, recurrence of temporal pain, diffuse arthralgias and SPEP/UPEP negative -- increased concern for recurrence of GCA, started on Actemra and moderate dose steroids with improvement in sx and labs, currently quiescent.  # PMR/GCA, + hx of sarcoid, HA continues to appear more tension mediated - c/w Actemra qweekly dosing - GCA symptoms reviewed with patient, advised to call office if develops any, go to ER if any visual changes - recent imaging without pulm or neuro sarcoid  - March reviewed and stable - repeat prior to next visit   # immunosuppressed status, prior single dermatome limited shingles outbreak - pt knows to call if febrile or unwell, knows to hold medication while on Abx  # OA and MSK related pain in back and shoulders/hips, TMJ. Tapering of steroids has unmasked this. Generalized deconditioning and poor adherence to lifestyle changes contributing and progressing. 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 Once completes this, can try for PT focused on neck/shoulders/chest/back as 2nd site with significant issues   # OP in femoral neck, lowest T score -2.7, + steroids, low Vit D. Appears to have gotten 1 Prolia dose with PMD in mid-2024. Repeat DEXA 2025 in osteopenia range.  - given stability in osteopenia range and less falls, unclear role for further OP med mgmt from rheum standpoint  - c/w dietary Ca 600mg BID with food - c/w Vit D 1000 IU daily - Jan 2025 levels in range  - Weight bearing exercise for 30min 3-4x/week to maintain BMD as feasible  - repeat DEXA 4/2027   RTC 4 months

## 2025-05-13 NOTE — HISTORY OF PRESENT ILLNESS
[FreeTextEntry1] : SUNITA QUEZADA is a 76 year old woman who presents for rheumatologic second opinion for prior dx of GCA/PMR. Markedly elevated ESR since Nov 2019, with peak at 114 in July 2020 accompanied by shoulder/hip girdle as well as diffuse arthralgias with stiffness and limited ROM as well as worsening temporal headaches and scalp tenderness. At that time, pt underwent b/l TAB which were negative, and was started on prednisone 40mg daily with improvement in above sx. Has since been able to taper to 5mg daily which she has been on for the last month. Overall sx have not recurred at pre-steroid levels during taper but pt does report ongoing mild sx of scalp TTP and intermittent temporal HA with only moderate decline in ESR, with last check at 47. In light of this, prior rheum suggested transition from steroids to Actemra given her other comorbidities including DM2. Pt was reluctant to start injectable biologics.  + R TMJ pain but with palpation as well as chewing and no jaw fatigability and not selecting softer foods  + extensive OA with chronic mild pain from this + sarcoid, stable at present without any pulmonary, skin, ocular or joint involvement   Denies visual changes, jaw claudication, F/C, night sweats, unintentional weight loss, limb weakness or paresthesias.   Inflammatory arthritis ROS negative for symmetrical peripheral joint synovitis, prolonged AM stiffness, enthesitis, dactylitis, psoriasis/ rashes, eye inflammation, inflammatory low back pain, IBD.   Labs - + ESR, peak 114, presently 47, now risen to 72  Negative - RF, KRISTINE, Hep B, TB  TAB b/l - negative for GCA  XR knees - small enthesophyte, minimal OA  XR ribs - negative, NAPD  DEXA 2022 - lowest T score -2.7 in hip, worse than prior  -------  2/4/21 -- Diffuse OA related arthralgias as she is tapering off steroids but remains without PMR or GCA sx. Ongoing R TMJ but not fatigability of jaw, rather pain with increased oral aperture 2/2 joint. Less ambulatory 2/2 OA pain, noting weight increasing and LE edema. No sarcoid sx.   3/11/21 -- Since last visit, s/p both covid vaccine doses, severe myalgias/arthralgias following each but improved after 1st one and does not seem to be improving s/p 2nd one 1 week ago. + diffuse joint pains in shoulder/hip girdle and also wrists and knees, but no synovitis. HA worsening though still intermittent but TA tenderness has returned. Vision stable, no jaw claudication. Ongoing TMJ issues, due to see ENT. No CP, SOB, F/C, infectious sx, focal weakness or neuropathic sx.   4/2/21 -- Started on Actemra 2 weeks ago, no issues with administering, brings son today so we can teach him as well. Ongoing dull HA, partially improved with Tylenol, prior TA tenderness resolved, vision stable, and no current PMR complaints. No F/C, infectious sx, feels well otherwise.   5/7/21 -- Increased fatigue, diffuse arthralgias, shingles on L buttock that began shortly after last visit, pt did not notify any of her physicians, continued Actemra. Lesions have now fully resolved, no active vesicles but with some post herpetic pain, over a single dermatome, did not have disseminated disease. Remains with occasional dull HA but has improved since last visit, remainder of GCA/PMR ROS negative.   7/9/21 -- TMJ resolved, HA has also markedly improved. Pending R cataract sx. Feeling well otherwise, no PMR sx, no other GCA sx, no issues with injections. No infectious sx.   10/19/21-- Worsening pain in b/l shoulders, CT spinal pain as well, temporary improvement with trigger injections x 2 weeks, then spinal pain returns. Pain mgmt is trying to increase Lyrica dose but its is sedating her. No current HA, remainder of GCA/PMR ROS negative, no infectious sx, having some issues with self administration of Actemra but her son helps her if needed.   11/19/21 -- Returns early as she feels she needs the prednisone on some days and wants to know if she can use prn 2.5mg/day. Also will be [place] of state and wont be able to get actemra delivered, asking if ok to miss dose by 3 days. GCA ROS/PMR ROS negative, the low dose steroids have moreso been helping with her neck/upper back pain. Asking about trials of other meds for her ongoing pedal neuropathy as she has not been able to increase lyrica and now wants to get off it if she can. Also worried that her memory seems to be getting worse but no issues with ingrid forgetting of things, never gets lost.   1/13/22 -- Overall feeling better from last visit, lower Lyrica dose is less sedating. Ongoing QHS cervicogenic HA but responsive to low dose tylenol (taking 250mg), asking if she should continue trigger point injections as not always helpful. GCA/PMR ROS negative, taking Actemra without issues, no infectious sx. 1 fall but no fx, being more cautious with walking.   3/18/22 -- Ongoing OA related arthralgias, no synovitis and GCA/PMR ROS remain negative. Continues to have an issue with self administering Actemra so has been using it approx 10 min out of the fridge but this is causing some pain/bruising, not amenable to changing to infusions however. No further falls.   4/12/22 -- GCA/PMR quiescent, no peripheral synovitis, ongoing diffuse OA related arthralgias and worsening neuropathy especially in RLE since tapering dose of Lyrica. Does feel being on steroids daily somewhat helps, has not been taking Tylenol consistently. Intermittent HA but without GCA characteristics, likely related to eye strain and will be getting cataract sx upcoming. No falls. No infectious sx, still feels like Actemra injection is difficult.   5/27/22 -- Actemra injections going better. No current PMR/GCA or sarcoid sx, no peripheral synovitis. Ongoing OA related pain but able to do all ADLs. Tolerated increase in Lyrica and helping but not fully, asking if we can increase again, not overly sedating her. Ongoing intermittent HA but responsive to tylenol, s/p cataract sx and healed well. No falls.   7/12/22 -- Feeling unwell x 2 weeks, fatigued, diffuse arthralgias, worst in L shoulder where she has prior limited ROM, and L hip which has had achy pain with radiation to thigh, tho not worsening with ROM. + some pedal edema, sharp temporal HA over R side. Neuro saw her, had MRI, doppler and EEG -- all normal. No infectious sx, no F/C, no synovitis, no rashes, remainder of GCA ROS negative and R shoulder unaffected.    9/9/22 -- Prior severe fatigue and arthralgias have resolved. More chronic pain at present and some dependant edema now, has been advised to limit salt in diet but continues to have a high salt intake and son present at visit today reports she minimally walks during the day. She is moving to a new apt with 2 flights of stairs to enter, hard time walking up the stairs. Cardiology did ask for a stress test, she has not scheduled yet. GCA/PMR ROS negative, taking Actemra daily, no SE or infections.   12/13/22 -- Overall stable from last, ongoing chronic arthralgias with ongoing gait issues and L hip arthralgias, no falls but hard to move around, did not go to PT, PMD recently reordered PT which she has not started. PMR/GCA ROS negative, recent optho exam also negative, ongoing tension HA but responsive to Tylenol. No infectious sx.   3/7/23 -- Ongoing C spine and shoulder MSK pain, more focal TTP over R sided scapula/posterior ribs x few weeks, no preceding trauma, ongoing L sided costochondritis pain, seeing cards tomorrow too, has not had stress test as yet. Has not started PT or optimized tylenol at present tho does find benefit with QHS dosing. No current PMR/GCA sx, ongoing intermittent tension HA, remains responsive to tylenol. No other inflammatory sx. She had cut back on driving 2/2 her chronic peripheral neuropathy.   5/19/2023 -- Endorsing worsening joint pain, weakness, near falls, and difficulty doing tasks, more constant than it has been in the past, has not been able to follow up with PT. Endorsing headaches, not constant or daily, describes as nagging but not worse headache of life, Tylenol not always effective, will usually fall asleep and pain resolves. Doing Actemra qow and still on prednisone 5mg qday. Sarcoid ROS negative. intermittent productive cough with clear phlegm, worse in AM and directly before bed, which is new. No clear infectious sx.   8/29/23 -- Son accompanied pt to visit today. Recent fall but no ongoing pain, worsening deconditioning as she is minimally active during the day, reports diffuse pain but stable, reports ongoing depressive sx but hasn't been taking her SSRI for a long time (tho reports telling her PMD she has been taking), no SI/HI. Not driving at present and still living in apt with stairs but son is trying to get her alternative, more accessible housing. Resumed Actemra qweekly, no issues, no current PMR/GCA sx, remains with tension HA responsive to tylenol. Sarcoid ROS negative, prior cough resolved.   11/30/23 -- Pt says she has had worsening memory the last couple of months. Had TIA episode 10/2023. Also has fallen 3x since 10/2023. Says she ambulates and falls, but does not remember how she falls. Has not gone to hospital since falls. Has lower back, hip, rib pain since falls. Has stopped driving 2/2 above, but continues to live alone, feels she can still do ADLs independently.   4/11/24 -- Pt had mechanical fall in office last visit, hospitalized and was in [place] for ~1 month, ambulating better with walker but still reports some unsteady gait. Cardiac/neuro workup negative, ultimately thought to be 2/2 dehydration vs iatrogenic hypotension 2/2 home meds. After discharge, had syncope fall, workup again unrevealing, ACE discontinued. Pt says she feels well today, more mindful to keep hydrated with water. Has same chronic baseline frontal tension headaches which remain Tylenol responsive, GCA and PMR ROS negative, remains on weekly Actemra and prednisone 5mg daily. Sarcoid ROS negative, imaging not suggestive either.   5/30/24 -- Using walker consistently, 1 slide/fall into a chair but no ingrid falls, completed home PT and has refused regular PT. Ongoing stress HA over temples, remains responsive to tylenol, GCA ROS negative. No infections with Actemra/prednisone. Neurology is planning to resume Botox for her neck pain. Has lost some weight and thinks its helping with overall pain. Notes more diarrhea than prior, 2 episodes of incontinence recently, no blood, alternating with constipation.  PMD planning to start Prolia for her.   9/26/24 - She is using a walker consistently. + frequent nightmares with Cymbalta  60 mg so PCP reduced it to 40 mg about two weeks ago, but she discontinued it due to 'personality changes' and ongoing nightmares which have resolved. Taking Actemra, no infectious sx, GCA/PMR ROS negative, s/p Prolia with mild, self limited arthralgia. Remains on Lyrica but only taking  mg QHS depending on day.   1/14/25 -- Continues to use walker, no falls but recent episode where couldn't get out of bed, had to slide to ground, better with slight prednisone increase from PMD. PMR/GCA ROS negative, still with occasional tension HA. Lyrica helping with diffuse arthralgias, remains very sedentary.   5/13/25 -- Getting ECHO with cards tomorrow, just completed Holter monitoring for palps 2/2 chest heaviness on L side, still with MSK chest wall pain too. Gait remains unsteady but no falls, using walker, Pulm ordered PT for this and pt reports she has appt scheduled. Lyrica and low dose prednisone helping with pain, using Tylenol infrequently. PMR/GCA ROS negative. No Actemra SE or infectious sx.

## 2025-05-13 NOTE — PHYSICAL EXAM
[General Appearance - Alert] : alert [General Appearance - In No Acute Distress] : in no acute distress [General Appearance - Well Nourished] : well nourished [Sclera] : the sclera and conjunctiva were normal [PERRL With Normal Accommodation] : pupils were equal in size, round, and reactive to light [Extraocular Movements] : extraocular movements were intact [Outer Ear] : the ears and nose were normal in appearance [Oropharynx] : the oropharynx was normal [Neck Appearance] : the appearance of the neck was normal [Respiration, Rhythm And Depth] : normal respiratory rhythm and effort [No Spinal Tenderness] : no spinal tenderness [Nail Clubbing] : no clubbing  or cyanosis of the fingernails [Musculoskeletal - Swelling] : no joint swelling seen [Motor Tone] : muscle strength and tone were normal [] : no rash [Motor Exam] : the motor exam was normal [Oriented To Time, Place, And Person] : oriented to person, place, and time [Affect] : the affect was normal [FreeTextEntry1] : Decreased sensation to light touch b/l feet, + b/l LE ongoing sensitivity to touch

## 2025-05-13 NOTE — REVIEW OF SYSTEMS
[Arthralgias] : arthralgias [Joint Pain] : joint pain [Difficulty Walking] : difficulty walking [Negative] : Heme/Lymph [As Noted in HPI] : as noted in HPI [Joint Swelling] : no joint swelling [Joint Stiffness] : no joint stiffness [de-identified] : Memory issues

## 2025-05-13 NOTE — REASON FOR VISIT
[id number] Sent to Pre Auth regarding Fulphila by injection.  Spoke with Roxy, she stated that this may take days to get approved but would put a rush on approve.    Insurance needed documentation that Fulphila Injection had failed before insurance company would look at Neulasta injection.     [Follow-Up: _____] : a [unfilled] follow-up visit [FreeTextEntry1] : GCA/PMR, ESR, sarcoidosis

## 2025-05-21 NOTE — HISTORY OF PRESENT ILLNESS
[FreeTextEntry1] : Nina Conde is a 79 year old woman with past medical history of Hypertension, Hyperlipidemia, Chronic kidney disease, Asthma, Sarcoidosis, Polymyalgia Rheumatica and TIA with recent unremarkable coronary angiogram (1/2025) presents for follow up visit.  Since her last visit she reports feeling better, improved energy. She has been taking HCTZ 25 mg daily and has home BP log with BP in 120-130/80s with HR in 70s. She reports infrequent episodes of brief, sharp chest pain that is not exertional. Denies shortness of breath.

## 2025-05-21 NOTE — ASSESSMENT
[FreeTextEntry1] : Assessment: Nina Conde is a 79 year old woman with past medical history of Hypertension, Hyperlipidemia, Chronic kidney disease, Asthma, Sarcoidosis, Polymyalgia Rheumatica and TIA with recent unremarkable coronary angiogram (1/2025) presents for follow up visit.  Since her last visit she reports feeling better, improved energy. She has been taking HCTZ 25 mg daily and has home BP log with BP in 120-130/80s with HR in 70s. She reports infrequent episodes of brief, sharp chest pain that is not exertional. ECG today consistent with sinus rhythm and poor R wave progression, similar to prior ECG. BP stable. Echocardiogram (5/2025) consistent with normal LVEF 60-65%, moderate basal septal LVH, normal RV systolic function. Cardiac event monitor (5/2025) consistent with normal sinus rhythm and 6 SVT episodes. Carotid US (9/2024) consistent with no significant hemodynamic stenosis b/l. Coronary angiogram (1/2025) consistent with normal LM, other vessels with minor irregularities.   Recommendations: [] Palpitations: Symptoms infrequent, PSVT confirmed on event monitor. Continue Coreg 6.25 mg BID which cannot be increased further due to baseline sinus bradycardia.  [] Leg edema: Resolved as patient now on HCTZ.  [] Hyperlipidemia: LDL 62 mg/dl (1/2025) is well controlled. Continue Atorvastatin 40 mg daily  [] Hypertension: BP stable, continue HCTZ 25 mg daily and Coreg 6.25 mg BID. Patient will continue to monitor BP. [] Return to office: 3 months or sooner if needed

## 2025-05-21 NOTE — CARDIOLOGY SUMMARY
[de-identified] : ECG (10/9/23) at [facility]: sinus rhythm, left axis deviation, LVH, poor R wave progression (similar to prior ECG) ECG (1/25/24): normal sinus rhythm, left axis deviation, borderline RVH, poor R wave progression ECG (1/10/25): normal sinus rhythm, left axis deviation, poor R wave progression ECG (5/21/25): normal sinus rhythm, left axis deviation, poor R wave progression [de-identified] : Cardiac Event Monitor (11/2023): Normal sinus rhythm. One NSVT (7 beats). 8 SVT episodes (longest 42 seconds).  Cardiac Event Monitor (9/2024): Normal sinus rhythm. One NSVT (14 beats). 4 SVT (11 beats).  Cardiac Event Monitor (5/2025): Normal sinus rhythm. 6 SVT (7 beats, fastest 174 BPM). [de-identified] : Nuclear stress test (1/2025): Small sized, mild-moderate intensity, mostly reversible perfusion defect of the apical inferior and mid inferolateral wall suggestive of ischemia. Post stress LVEF 84%.  [de-identified] : TTE (5/2025): LVEF 60-65%, small LV cavity size, moderate basal septal hypertrophy. Normal RV systolic function. Mild TR. Mild AR.  [de-identified] : Coronary angiogram (1/24/25): LM normal. LAD minor irregularities. D1 minor irregularities. LCx minor irregularities. OM1 minor irregularities. RCA minor irregularities.  [de-identified] : Carotid US (9/2024): No significant hemodynamic stenosis b/l. Kidney arterial US (7/2024): No evidence of a significant renal artery stenosis Right arm arterial US (3/2025): No stenosis  Right arm venous US (3/2025): No DVT

## 2025-05-21 NOTE — PHYSICAL EXAM
[Normal Conjunctiva] : normal conjunctiva [Normal] : alert and oriented, normal memory [de-identified] : no acute distress [de-identified] : supple [de-identified] : JVP ~ 7 cm H20, RRR, s1, s2, no murmurs [de-identified] : unlabored respirations, clear lung fields [de-identified] : obese [de-identified] : no lower extremity edema b/l

## 2025-05-21 NOTE — CARDIOLOGY SUMMARY
[de-identified] : ECG (10/9/23) at [facility]: sinus rhythm, left axis deviation, LVH, poor R wave progression (similar to prior ECG) ECG (1/25/24): normal sinus rhythm, left axis deviation, borderline RVH, poor R wave progression ECG (1/10/25): normal sinus rhythm, left axis deviation, poor R wave progression ECG (5/21/25): normal sinus rhythm, left axis deviation, poor R wave progression [de-identified] : Cardiac Event Monitor (11/2023): Normal sinus rhythm. One NSVT (7 beats). 8 SVT episodes (longest 42 seconds).  Cardiac Event Monitor (9/2024): Normal sinus rhythm. One NSVT (14 beats). 4 SVT (11 beats).  Cardiac Event Monitor (5/2025): Normal sinus rhythm. 6 SVT (7 beats, fastest 174 BPM). [de-identified] : Nuclear stress test (1/2025): Small sized, mild-moderate intensity, mostly reversible perfusion defect of the apical inferior and mid inferolateral wall suggestive of ischemia. Post stress LVEF 84%.  [de-identified] : TTE (5/2025): LVEF 60-65%, small LV cavity size, moderate basal septal hypertrophy. Normal RV systolic function. Mild TR. Mild AR.  [de-identified] : Coronary angiogram (1/24/25): LM normal. LAD minor irregularities. D1 minor irregularities. LCx minor irregularities. OM1 minor irregularities. RCA minor irregularities.  [de-identified] : Carotid US (9/2024): No significant hemodynamic stenosis b/l. Kidney arterial US (7/2024): No evidence of a significant renal artery stenosis Right arm arterial US (3/2025): No stenosis  Right arm venous US (3/2025): No DVT

## 2025-05-21 NOTE — PHYSICAL EXAM
[Normal Conjunctiva] : normal conjunctiva [Normal] : alert and oriented, normal memory [de-identified] : no acute distress [de-identified] : supple [de-identified] : JVP ~ 7 cm H20, RRR, s1, s2, no murmurs [de-identified] : unlabored respirations, clear lung fields [de-identified] : obese [de-identified] : no lower extremity edema b/l

## 2025-05-21 NOTE — REVIEW OF SYSTEMS
[Palpitations] : palpitations [Chest Discomfort] : chest discomfort [Headache] : no headache [Blurry Vision] : no blurred vision [Sore Throat] : no sore throat [SOB] : no shortness of breath [Lower Ext Edema] : no extremity edema [Syncope] : no syncope [Cough] : no cough [Abdominal Pain] : no abdominal pain [Rash] : no rash [Dizziness] : no dizziness [Confusion] : no confusion was observed

## 2025-07-14 NOTE — HISTORY OF PRESENT ILLNESS
[FreeTextEntry1] : general follow up [de-identified] : doing well no new complaints still tired zepbound wasn't covered  was taken off lyrica tried gabapentin now up to 1500mg daily